# Patient Record
Sex: MALE | Race: WHITE | Employment: FULL TIME | URBAN - METROPOLITAN AREA
[De-identification: names, ages, dates, MRNs, and addresses within clinical notes are randomized per-mention and may not be internally consistent; named-entity substitution may affect disease eponyms.]

---

## 2020-12-08 PROBLEM — U07.1 INFECTION DUE TO 2019 NOVEL CORONAVIRUS: Status: ACTIVE | Noted: 2020-12-08

## 2020-12-08 PROBLEM — Z76.89 ENCOUNTER TO ESTABLISH CARE WITH NEW DOCTOR: Status: ACTIVE | Noted: 2020-12-08

## 2020-12-08 PROBLEM — M21.612 BILATERAL BUNIONS: Status: ACTIVE | Noted: 2020-12-08

## 2020-12-08 PROBLEM — M21.611 BILATERAL BUNIONS: Status: ACTIVE | Noted: 2020-12-08

## 2021-10-11 ENCOUNTER — E-VISIT (OUTPATIENT)
Dept: PRIMARY CARE CLINIC | Age: 31
End: 2021-10-11
Payer: COMMERCIAL

## 2021-10-11 PROCEDURE — 99422 OL DIG E/M SVC 11-20 MIN: CPT | Performed by: INTERNAL MEDICINE

## 2021-10-11 ASSESSMENT — LIFESTYLE VARIABLES
SMOKING_YEARS: 3
SMOKING_STATUS: NO, I'M A FORMER SMOKER
PACKS_PER_DAY: .5

## 2021-10-11 NOTE — PROGRESS NOTES
It is time for you to reach out to your primary care provider and get examined. Please make an appointment with ARISTEO Griffin for a physical examination and treatment suggestions. 11 to 20 minutes were spent on this E visit.

## 2021-10-14 PROBLEM — U07.1 INFECTION DUE TO 2019 NOVEL CORONAVIRUS: Status: RESOLVED | Noted: 2020-12-08 | Resolved: 2021-10-14

## 2021-10-14 PROBLEM — H69.83 DYSFUNCTION OF BOTH EUSTACHIAN TUBES: Status: ACTIVE | Noted: 2021-10-14

## 2021-11-19 PROBLEM — R59.0 CERVICAL LYMPHADENOPATHY: Status: ACTIVE | Noted: 2021-11-19

## 2021-11-19 PROBLEM — K21.9 GASTROESOPHAGEAL REFLUX DISEASE WITHOUT ESOPHAGITIS: Status: ACTIVE | Noted: 2021-11-19

## 2022-04-19 ENCOUNTER — E-VISIT (OUTPATIENT)
Dept: PRIMARY CARE CLINIC | Age: 32
End: 2022-04-19
Payer: COMMERCIAL

## 2022-04-19 DIAGNOSIS — Z71.84 COUNSELING FOR TRAVEL: Primary | ICD-10-CM

## 2022-04-19 PROCEDURE — 99422 OL DIG E/M SVC 11-20 MIN: CPT | Performed by: NURSE PRACTITIONER

## 2022-04-19 RX ORDER — CIPROFLOXACIN 500 MG/1
500 TABLET, FILM COATED ORAL 2 TIMES DAILY
Qty: 20 TABLET | Refills: 0 | Status: SHIPPED | OUTPATIENT
Start: 2022-04-19 | End: 2022-04-29

## 2022-04-19 RX ORDER — ONDANSETRON 4 MG/1
4 TABLET, FILM COATED ORAL EVERY 8 HOURS PRN
Qty: 20 TABLET | Refills: 0 | Status: SHIPPED | OUTPATIENT
Start: 2022-04-19

## 2022-04-19 RX ORDER — LOPERAMIDE HYDROCHLORIDE 2 MG/1
2 CAPSULE ORAL 4 TIMES DAILY PRN
Qty: 30 CAPSULE | Refills: 0 | Status: SHIPPED | OUTPATIENT
Start: 2022-04-19 | End: 2022-04-29

## 2022-04-19 ASSESSMENT — LIFESTYLE VARIABLES
PACKS_PER_DAY: .5
SMOKING_YEARS: 2
SMOKING_STATUS: NO, I'M A FORMER SMOKER

## 2022-04-19 NOTE — PROGRESS NOTES
Reviewed questionnaire    Reviewed meds/allergies    Dx Travel     Plan Rx given for cipro, imodium and zofran, follow up with PCP as needed.     Time spent on visit 11 min